# Patient Record
Sex: MALE | Race: WHITE | NOT HISPANIC OR LATINO | Employment: OTHER | URBAN - METROPOLITAN AREA
[De-identification: names, ages, dates, MRNs, and addresses within clinical notes are randomized per-mention and may not be internally consistent; named-entity substitution may affect disease eponyms.]

---

## 2022-09-26 ENCOUNTER — PATIENT OUTREACH (OUTPATIENT)
Dept: ONCOLOGY | Facility: CLINIC | Age: 77
End: 2022-09-26

## 2022-09-26 ENCOUNTER — TRANSCRIBE ORDERS (OUTPATIENT)
Dept: ONCOLOGY | Facility: CLINIC | Age: 77
End: 2022-09-26

## 2022-09-26 DIAGNOSIS — C61 PROSTATE CANCER (H): Primary | ICD-10-CM

## 2022-09-26 NOTE — PROGRESS NOTES
New Patient Oncology Nurse Navigator Note     Referring provider: Self referral     Referred to (specialty): Medical Oncology    Date Referral Received: 09/26/22     Evaluation for : Second opinion prostate cancer status post prostatectomy 2000, rising PSA     Clinical History (per Nurse review of records provided):      Ced has a history of a pT2 N0 M0, stage 1 adenocarcinoma of the prostate, radical retropubic prostatectomy 9/2000, Gabriel's 7(3+4) pretreatment PSA 5.33    Urologist performing bilateral pelvic lymphadenectomy and radical retropubic prostatectomy was Dr. Dontae Richards at [facility] in [place], [place].      PSA counts copied from Dr. Mando Ngo with [facility]    SCREENING PSA, EXTERNAL   Date/Time Value Ref Range Status       08/12/2011 0.05    10/29/2015 0.36      05/17/2016 0.41    03/30/2017 0.69    10/02/2017 0.58    04/1//2018 0.84    05/28/2019 1.10    07/09/2021 2.26    11/3/2021 2.24    02/05/2022 2.01([place])    03/08/2022 1.76([place])    4/12/2022 1.70([place])    5/2/2022 3.01     He had a negative bone scan and CT per note in Fall of 2021, however records not available at this time.    Patient declined salvage radiation approximately 1 year ago and has been followed by PSA surveillance.  He is interested in discussion for second opinion with medical oncology and possibly a PSMA PET scan.      Records Location (Care Everywhere, Media, etc.):   Care Everywhere- [facility]      Records Needed:   [facility]- Bone scan and CT scan September 20, 2021  Any other imaging they may have as well   [facility]    Ced called in referral to see Dr. Gilmore for his history of prostate cancer.  Since Dr. Gilmore is not taking new patients at this time, he recommended he see Dr. Baker.  I reviewed with Ced and he is open to seeing him for a second opinion appointment.  I reviewed his history and records location.  I let Ced know  our medical records team may be reaching out if a release of information is needed to obtain outside records.  I was able to pull the majority of records in through Care Everywhere.    Plan is for Ced to see Dr. Baker on Monday, October 17, via new patient video, to be prepared by 1:15 pm.  I have given Ced the new patient scheduling and main clinic phone numbers.  I will also email him this information and note, per his request, to him via secure email.  I will include my number for contact if needed.  He has no other questions at this time. I will forward referral with scheduling instruction on to our new patient scheduling team once remaining records are in process.

## 2022-09-27 ENCOUNTER — PRE VISIT (OUTPATIENT)
Dept: ONCOLOGY | Facility: CLINIC | Age: 77
End: 2022-09-27

## 2022-09-27 NOTE — TELEPHONE ENCOUNTER
Action October 7, 2022 7:43 PM ABT   Action Taken Images from [facility] & NM received and resolved to PACS     RECORDS STATUS - ALL OTHER DIAGNOSIS      RECORDS RECEIVED FROM: [facility]   DATE RECEIVED: 10/07/22   NOTES STATUS DETAILS   OFFICE NOTE from referring provider SELF    OFFICE NOTE from medical oncologist [facility] 08/31/21: Dr. Fadi Romero   OFFICE NOTE from radiation oncologist [facility] 05/11/22: Dr. Niall Ngo   OFFICE NOTE from urologist [facility] 12/09/15: Dr. Joshua Portillo   MEDICATION LIST [facility]    LABS     PATHOLOGY REPORTS Report in Media tab [facility]:  08/16/00: [id number]   ANYTHING RELATED TO DIAGNOSIS [facility] Most recent 06/30/22   IMAGING (NEED IMAGES & REPORT)     CT SCANS PACS [facility]:  06/06/16-01/09/07: CT Abd Pel    NM:  09/20/21: CT Chest Abd Pel   NM PACS [facility]:  11/11/15: NM Bone Scan    NM:  09/20/21: NM Bone Scan Whole Body   XRAYS PACS [facility]:  11/11/15: XR Abd

## 2022-10-03 ENCOUNTER — HEALTH MAINTENANCE LETTER (OUTPATIENT)
Age: 77
End: 2022-10-03

## 2022-10-15 NOTE — PROGRESS NOTES
Dennis Hilligos is a 77 year old who is being evaluated via a billable video visit.      How would you like to obtain your AVS? MyChart  Will anyone else be joining your video visit? No        Video-Visit Details    Type of service:  Video Visit    Originating Location (pt. Location):  Home        Distant Location (provider location):  [facility]    Platform used for Video Visit:  [employer]    Medication and allergies have been reviewed.     VICTOR HUGO Hogue      ----------------------------------------------------------------------------------------------------------      NEW PATIENT SECOND OPINION CONSULTATION    Reason for Visit:  Biochemically-recurrent prostate cancer.    History of Present Illness:  Dear Dr Mando Ngo,    Thank you for referring your patient for a Second Opinion consultation at the [facility].  A brief overview of his oncological history as well as my recommendations follow below.    Mr. Hilligoss is a 77-year-old man who was diagnosed with prostate cancer in 2000, at the age of 55.  His PSA level at that time was 5.3 ng/mL.  He underwent a radical prostatectomy on 9/2000, which revealed [place] 3+4=7 prostate adenocarcinoma, stage pT2 N0 R0.     Following surgery, he subsequently developed a biochemical recurrence in 2011.  He has declined salvage radiotherapy.  In the past 3 years, his PSA level has been rising at a slightly faster pace.  On 5/28/2019 his PSA was 1.1, on 11/3/2021 his PSA was 2.24, on 5/2/2022 his PSA was 3.01, and on 10/13/2022 his PSA was 3.07 ng/mL.  His PSA doubling time is 24 months.    He underwent a CT scan and Bone scan on 9/20/2021.  Both imaging modalities failed to show any local recurrence or metastatic disease.  He presents today to discuss optimal management of his biochemically-recurrent prostate cancer.  He is also interested in hearing about the use of novel imaging technologies such as  PSMA-PET scanning.    Review of Systems:  The patient reports feeling generally well.  He does have bilateral hearing loss.  He does not relate any significant urological symptoms at this time.  He has not lost or gained any weight recently.  He does not have any cancer related pain.  A comprehensive 14-system review of symptoms is otherwise generally within normal limits.  His ECOG score is 0.  His pain score is 0/10.    Past Medical History:  Hypertension  Hyperlipidemia  Diabetes mellitus, type 2  Coronary artery disease s/p stenting  Chronic kidney disease  Actinic keratoses  DJD of the spine, causing back pain  Sciatica  Gout  Cholelithiasis  Colonic adenomas    Medications:  Aspirin 81 mg  Clopidogrel 75 mg  Metformin 1000 mg BID    Allergies:  Atorvastatin  Niacin    Social History:  The patient lives in [place], [place].  He was a previous smoker, but quit smoking in 1982.  He drinks alcohol on a social basis (approximately once or twice per year).    Family History:  The patient has no family history of genitourinary cancers in general or prostate cancer in specific.    Physical Examination:  Mr. Hilligoss is a 77-year-old man who appears comfortable at rest.  His vital signs are unremarkable.  His HEENT examination is within normal limits.  There is no palpable cervical, axillary, supraclavicular or inguinal lymphadenopathy.  The cardiac, pulmonary and gastrointestinal examinations are generally within normal limits.  His neuromuscular examination is intact.  The extremities do not demonstrate pitting edema.  The skin evaluation is unremarkable.      ASSESSMENT AND PLAN:  Mr. Hilligoss is a 77-year-old man with a prior diagnosis of intermediate-risk prostate cancer (Gabriel 3+4=7, pT2 N0, PSA 5.3 ng/mL) who underwent radical prostatectomy in 9/2000, but subsequently developed a biochemical recurrence.  His current PSA level is 3.07 ng/mL, with a PSADT of 24 months.  His ECOG score is 0.  His pain score is  0/10.    We spent the first part of our consultation discussing the natural history of biochemically-recurrent prostate cancer, and reviewing the prognostic implications of the PSADT calculation.  In patients with a PSADT of 24 months, they generally have a metastasis-free survival (MFS) of 5-8 years, and an overall survival (OS) of 15-20 years.  Thus, in this setting we do not generally recommend initiation of systemic therapy, and in fact I have advised him against the use of androgen deprivation or other hormonal therapies at this time.    We also discussed the potential role of metastasis-directed therapy if a radiographic metastasis was detected.  He previously underwent a CT scan and a Bone scan in 9/2021, but he has not had any next-generation imaging assessments.  Therefore, he would be an appropriate candidate for a PSMA-PET scan, and I have ordered this today.  If he is found to have any metastatic lesions on the PSMA-PET scan, then he could certainly consider metastasis-directed radiotherapy at that time.  If his PSMA-PET scan is clear, then he should undergo PSA testing once every 6 months as well as a repeat PSMA-PET scan every 2 years.    A total of 80 minutes were spent on this patient on the day of the consultation, of which more than 50% of this time was used for counseling and coordination of care.  He was given the opportunity to ask multiple questions today, all of which were answered to his satisfaction.    Goldy Baker M.D.

## 2022-10-17 ENCOUNTER — VIRTUAL VISIT (OUTPATIENT)
Dept: ONCOLOGY | Facility: CLINIC | Age: 77
End: 2022-10-17
Attending: INTERNAL MEDICINE
Payer: MEDICARE

## 2022-10-17 DIAGNOSIS — C61 PROSTATE CANCER (H): ICD-10-CM

## 2022-10-17 PROCEDURE — 99205 OFFICE O/P NEW HI 60 MIN: CPT | Mod: 95 | Performed by: INTERNAL MEDICINE

## 2022-10-17 RX ORDER — CLOPIDOGREL BISULFATE 75 MG/1
75 TABLET ORAL
COMMUNITY
Start: 2022-09-19

## 2022-10-17 RX ORDER — EZETIMIBE 10 MG/1
10 TABLET ORAL
COMMUNITY

## 2022-10-17 RX ORDER — NITROGLYCERIN 0.4 MG/1
0.4 TABLET SUBLINGUAL
COMMUNITY
Start: 2021-07-26

## 2022-10-17 NOTE — LETTER
10/17/2022         RE: Dennis P Hilligoss  [address] [address]        Dear Colleague,    Thank you for referring your patient, Dennis P Hilligoss, to the [facility]. Please see a copy of my visit note below.    Dennis Hilligos is a 77 year old who is being evaluated via a billable video visit.      How would you like to obtain your AVS? MyChart  Will anyone else be joining your video visit? No    ----------------------------------------------------------------------------------------------------------      NEW PATIENT SECOND OPINION CONSULTATION    Reason for Visit:  Biochemically-recurrent prostate cancer.    History of Present Illness:  Dear Dr Mando Ngo,    Thank you for referring your patient for a Second Opinion consultation at the [facility].  A brief overview of his oncological history as well as my recommendations follow below.    Mr. Hilligoss is a 77-year-old man who was diagnosed with prostate cancer in 2000, at the age of 55.  His PSA level at that time was 5.3 ng/mL.  He underwent a radical prostatectomy on 9/2000, which revealed [place] 3+4=7 prostate adenocarcinoma, stage pT2 N0 R0.     Following surgery, he subsequently developed a biochemical recurrence in 2011.  He has declined salvage radiotherapy.  In the past 3 years, his PSA level has been rising at a slightly faster pace.  On 5/28/2019 his PSA was 1.1, on 11/3/2021 his PSA was 2.24, on 5/2/2022 his PSA was 3.01, and on 10/13/2022 his PSA was 3.07 ng/mL.  His PSA doubling time is 24 months.    He underwent a CT scan and Bone scan on 9/20/2021.  Both imaging modalities failed to show any local recurrence or metastatic disease.  He presents today to discuss optimal management of his biochemically-recurrent prostate cancer.  He is also interested in hearing about the use of novel imaging technologies such as PSMA-PET scanning.    Review of Systems:  The  patient reports feeling generally well.  He does have bilateral hearing loss.  He does not relate any significant urological symptoms at this time.  He has not lost or gained any weight recently.  He does not have any cancer related pain.  A comprehensive 14-system review of symptoms is otherwise generally within normal limits.  His ECOG score is 0.  His pain score is 0/10.    Past Medical History:  Hypertension  Hyperlipidemia  Diabetes mellitus, type 2  Coronary artery disease s/p stenting  Chronic kidney disease  Actinic keratoses  DJD of the spine, causing back pain  Sciatica  Gout  Cholelithiasis  Colonic adenomas    Medications:  Aspirin 81 mg  Clopidogrel 75 mg  Metformin 1000 mg BID    Allergies:  Atorvastatin  Niacin    Social History:  The patient lives in [place], [place].  He was a previous smoker, but quit smoking in 1982.  He drinks alcohol on a social basis (approximately once or twice per year).    Family History:  The patient has no family history of genitourinary cancers in general or prostate cancer in specific.    Physical Examination:  Mr. Hilligoss is a 77-year-old man who appears comfortable at rest.  His vital signs are unremarkable.  His HEENT examination is within normal limits.  There is no palpable cervical, axillary, supraclavicular or inguinal lymphadenopathy.  The cardiac, pulmonary and gastrointestinal examinations are generally within normal limits.  His neuromuscular examination is intact.  The extremities do not demonstrate pitting edema.  The skin evaluation is unremarkable.    ASSESSMENT AND PLAN:  Mr. Hilligoss is a 77-year-old man with a prior diagnosis of intermediate-risk prostate cancer ([place] 3+4=7, pT2 N0, PSA 5.3 ng/mL) who underwent radical prostatectomy in 9/2000, but subsequently developed a biochemical recurrence.  His current PSA level is 3.07 ng/mL, with a PSADT of 24 months.  His ECOG score is 0.  His pain score is 0/10.    We spent the first part of our consultation  discussing the natural history of biochemically-recurrent prostate cancer, and reviewing the prognostic implications of the PSADT calculation.  In patients with a PSADT of 24 months, they generally have a metastasis-free survival (MFS) of 5-8 years, and an overall survival (OS) of 15-20 years.  Thus, in this setting we do not generally recommend initiation of systemic therapy, and in fact I have advised him against the use of androgen deprivation or other hormonal therapies at this time.    We also discussed the potential role of metastasis-directed therapy if a radiographic metastasis was detected.  He previously underwent a CT scan and a Bone scan in 9/2021, but he has not had any next-generation imaging assessments.  Therefore, he would be an appropriate candidate for a PSMA-PET scan, and I have ordered this today.  If he is found to have any metastatic lesions on the PSMA-PET scan, then he could certainly consider metastasis-directed radiotherapy at that time.  If his PSMA-PET scan is clear, then he should undergo PSA testing once every 6 months as well as a repeat PSMA-PET scan every 2 years.    A total of 80 minutes were spent on this patient on the day of the consultation, of which more than 50% of this time was used for counseling and coordination of care.  He was given the opportunity to ask multiple questions today, all of which were answered to his satisfaction.        Again, thank you for allowing me to participate in the care of your patient.      Sincerely,    Goldy Baker MD

## 2022-10-27 ENCOUNTER — HOSPITAL ENCOUNTER (OUTPATIENT)
Dept: PET IMAGING | Facility: CLINIC | Age: 77
Discharge: HOME | End: 2022-10-27
Attending: INTERNAL MEDICINE
Payer: MEDICARE

## 2022-10-27 DIAGNOSIS — C61 PROSTATE CANCER (H): ICD-10-CM

## 2022-10-27 PROCEDURE — 74177 CT ABD & PELVIS W/CONTRAST: CPT | Mod: 26 | Performed by: RADIOLOGY

## 2022-10-27 PROCEDURE — 74177 CT ABD & PELVIS W/CONTRAST: CPT

## 2022-10-27 PROCEDURE — 78815 PET IMAGE W/CT SKULL-THIGH: CPT | Mod: 26 | Performed by: RADIOLOGY

## 2022-10-27 PROCEDURE — 250N000011 HC RX IP 250 OP 636: Performed by: INTERNAL MEDICINE

## 2022-10-27 PROCEDURE — 70491 CT SOFT TISSUE NECK W/DYE: CPT | Mod: 26 | Performed by: RADIOLOGY

## 2022-10-27 PROCEDURE — G1010 CDSM STANSON: HCPCS | Performed by: RADIOLOGY

## 2022-10-27 PROCEDURE — 343N000001 HC RX 343: Performed by: INTERNAL MEDICINE

## 2022-10-27 PROCEDURE — A9800 HC RX 343: HCPCS | Performed by: INTERNAL MEDICINE

## 2022-10-27 PROCEDURE — G1010 CDSM STANSON: HCPCS | Mod: PS

## 2022-10-27 PROCEDURE — 70491 CT SOFT TISSUE NECK W/DYE: CPT

## 2022-10-27 PROCEDURE — 71260 CT THORAX DX C+: CPT | Mod: 26 | Performed by: RADIOLOGY

## 2022-10-27 RX ORDER — IOPAMIDOL 755 MG/ML
45-135 INJECTION, SOLUTION INTRAVASCULAR ONCE
Status: COMPLETED | OUTPATIENT
Start: 2022-10-27 | End: 2022-10-27

## 2022-10-27 RX ADMIN — IOPAMIDOL 101 ML: 755 INJECTION, SOLUTION INTRAVENOUS at 14:59

## 2022-10-27 RX ADMIN — KIT FOR THE PREPARATION OF GALLIUM GA 68 GOZETOTIDE 6 MILLICURIE: 25 INJECTION, POWDER, LYOPHILIZED, FOR SOLUTION INTRAVENOUS at 15:00

## 2023-08-13 ENCOUNTER — HEALTH MAINTENANCE LETTER (OUTPATIENT)
Age: 78
End: 2023-08-13

## 2023-10-01 NOTE — PROGRESS NOTES
Virtual Visit Details    Type of service:  Video Visit     Originating Location (pt. Location):  Home    Distant Location (provider location):  [facility]  Platform used for Video Visit:  [employer]      ------------------------------------------------------------------------------------------------------------------    FOLLOW UP VISIT  -  TELEMEDICINE    Reason for Visit:  Biochemically-recurrent prostate cancer, on observation.    History of Present Illness:  Mr. Hilligoss is a 78-year-old man who was diagnosed with prostate cancer in 2000, at the age of 55.  His PSA level at that time was 5.3 ng/mL.  He underwent a radical prostatectomy on 9/2000, which revealed Gabriel 3+4=7 prostate adenocarcinoma, stage pT2 N0 R0.     Following surgery, he subsequently developed a biochemical recurrence in 2011.  He has declined salvage radiotherapy.  In the past 3 years, his PSA level has been rising at a slightly faster pace.  On 5/28/2019 his PSA was 1.1, on 11/3/2021 his PSA was 2.24, on 5/2/2022 his PSA was 3.01, and on 10/13/2022 his PSA was 3.07 ng/mL.  The most recent PSA (8/28/2023) was 3.6 ng/mL.  His PSA doubling time is 24 months.    He underwent a CT scan and Bone scan on 9/20/2021.  Both imaging modalities failed to show any local recurrence or metastatic disease.  He presents today to discuss optimal management of his biochemically-recurrent prostate cancer.  He had a PSMA-PET scan (10/27/2022) which showed a prostatectomy with focal PSMA uptake at the vicinity of the urethral anastomosis, suspicious for focal recurrence.  His PSA remains relatively stable.  He returns today for a virtual follow-up visit.    Review of Systems:  The patient reports feeling generally well.  He does have bilateral hearing loss.  He does not relate any significant urological symptoms at this time.  He has not lost or gained any weight recently.  He does not have any cancer related pain.  A comprehensive  14-system review of symptoms is otherwise generally within normal limits.  His ECOG score is 0.  His pain score is 0/10.    Past Medical History:  Hypertension  Hyperlipidemia  Diabetes mellitus, type 2 (hemoglobin A1C 7%)  Coronary artery disease s/p stenting  Chronic kidney disease  DJD of the spine, causing back pain  Sciatica  Gout  Cholelithiasis    Medications:  Aspirin 81 mg  Clopidogrel 75 mg  Metformin 1500 mg BID    Allergies:  Atorvastatin  Niacin    Physical Examination:  Mr. Hilligoss is a 77-year-old man who appears comfortable at rest.  His vital signs are unremarkable.  His HEENT examination is within normal limits.  There is no palpable cervical, axillary, supraclavicular or inguinal lymphadenopathy.  The cardiac, pulmonary and gastrointestinal examinations are generally within normal limits.  His neuromuscular examination is intact.  The extremities do not demonstrate pitting edema.  The skin evaluation is unremarkable.    PSMA-PET scan (10/27/22):  Prostatectomy with focal PSMA uptake at the vicinity of the urethral anastomosis, suspicious for focal recurrence.     Laboratories:  PSA (10/13/22): 3.1 ng/mL  PSA (03/15/23): 3.5 ng/mL  PSA (08/28/23): 3.6 ng/mL      ASSESSMENT AND PLAN:  Mr. Hilligoss is a 78-year-old man with a prior diagnosis of intermediate-risk prostate cancer ([place] 3+4=7, pT2 N0, PSA 5.3 ng/mL) who underwent radical prostatectomy in 9/2000, but subsequently developed a biochemical recurrence.  His current PSA level is 3.6 ng/mL, with a PSADT of 24 months.  His ECOG score is 0.  His pain score is 0/10.    We spent the first part of our consultation discussing the natural history of biochemically-recurrent prostate cancer, and reviewing the prognostic implications of the PSADT calculation.  In patients with a PSADT of 24 months, they generally have a metastasis-free survival (MFS) of 5-8 years, and an overall survival (OS) of 15-20 years.  Thus, in this setting we do not  generally recommend initiation of systemic therapy, and in fact I have advised him against the use of androgen deprivation or other hormonal therapies at this time.    We also discussed the potential role of metastasis-directed therapy if a radiographic metastasis was detected.  He previously underwent a CT scan and a Bone scan in 9/2021, but he has not had any next-generation imaging assessments.  His PSMA-PET scan from 10/2022 showed only focal PSMA uptake at the vicinity of the urethral anastomosis, suspicious for focal recurrence, but no evidence of distant spread.  Thus, no further action is needed at this time.  If he is found to have any metastatic lesions on future PSMA-PET scans, then he could certainly consider metastasis-directed radiotherapy at that time.  Moving forward, he should undergo PSA testing once every 6-12 months as well as a repeat PSMA-PET scan every 2 years or if his PSA level reaches 5.0 ng/mL.  His next PSMA scan should probably be performed around October 2024.    A total of 40 minutes were spent on this patient on the day of the consultation, of which more than 50% of this time was used for counseling and coordination of care.  He was given the opportunity to ask multiple questions today, all of which were answered to his satisfaction.    Goldy Baker M.D.

## 2023-10-02 ENCOUNTER — VIRTUAL VISIT (OUTPATIENT)
Dept: ONCOLOGY | Facility: CLINIC | Age: 78
End: 2023-10-02
Attending: INTERNAL MEDICINE
Payer: MEDICARE

## 2023-10-02 DIAGNOSIS — C61 MALIGNANT NEOPLASM OF PROSTATE (H): Primary | ICD-10-CM

## 2023-10-02 PROCEDURE — 99215 OFFICE O/P EST HI 40 MIN: CPT | Mod: 95 | Performed by: INTERNAL MEDICINE

## 2023-10-02 NOTE — NURSING NOTE
Is the patient currently in the state of [place]? YES    Visit mode:VIDEO    If the visit is dropped, the patient can be reconnected by: VIDEO VISIT: Text to cell phone:   Telephone Information:   Mobile [phone]       Will anyone else be joining the visit? NO  (If patient encounters technical issues they should call [phone]    How would you like to obtain your AVS? MyChart    Are changes needed to the allergy or medication list? No    Reason for visit: RECHECK (Follow up)    Kirby BONDS

## 2023-10-02 NOTE — LETTER
10/2/2023         RE: Dennis P Hilligoss  [address] [address]        Dear Colleague,    Thank you for referring your patient, Dennis P Hilligoss, to the [facility]CER [facility]. Please see a copy of my visit note below.      Virtual Visit Details    Type of service:  Video Visit     Originating Location (pt. Location):  Home    Distant Location (provider location):  [facility]  Platform used for Video Visit:  Keith      ------------------------------------------------------------------------------------------------------------------    FOLLOW UP VISIT  -  TELEMEDICINE    Reason for Visit:  Biochemically-recurrent prostate cancer, on observation.    History of Present Illness:  Mr. Hilligoss is a 78-year-old man who was diagnosed with prostate cancer in 2000, at the age of 55.  His PSA level at that time was 5.3 ng/mL.  He underwent a radical prostatectomy on 9/2000, which revealed [place] 3+4=7 prostate adenocarcinoma, stage pT2 N0 R0.     Following surgery, he subsequently developed a biochemical recurrence in 2011.  He has declined salvage radiotherapy.  In the past 3 years, his PSA level has been rising at a slightly faster pace.  On 5/28/2019 his PSA was 1.1, on 11/3/2021 his PSA was 2.24, on 5/2/2022 his PSA was 3.01, and on 10/13/2022 his PSA was 3.07 ng/mL.  The most recent PSA (8/28/2023) was 3.6 ng/mL.  His PSA doubling time is 24 months.    He underwent a CT scan and Bone scan on 9/20/2021.  Both imaging modalities failed to show any local recurrence or metastatic disease.  He presents today to discuss optimal management of his biochemically-recurrent prostate cancer.  He had a PSMA-PET scan (10/27/2022) which showed a prostatectomy with focal PSMA uptake at the vicinity of the urethral anastomosis, suspicious for focal recurrence.  His PSA remains relatively stable.  He returns today for a virtual follow-up visit.    Review of Systems:  The  patient reports feeling generally well.  He does have bilateral hearing loss.  He does not relate any significant urological symptoms at this time.  He has not lost or gained any weight recently.  He does not have any cancer related pain.  A comprehensive 14-system review of symptoms is otherwise generally within normal limits.  His ECOG score is 0.  His pain score is 0/10.    Past Medical History:  Hypertension  Hyperlipidemia  Diabetes mellitus, type 2 (hemoglobin A1C 7%)  Coronary artery disease s/p stenting  Chronic kidney disease  DJD of the spine, causing back pain  Sciatica  Gout  Cholelithiasis    Medications:  Aspirin 81 mg  Clopidogrel 75 mg  Metformin 1500 mg BID    Allergies:  Atorvastatin  Niacin    Physical Examination:  Mr. Hilligoss is a 77-year-old man who appears comfortable at rest.  His vital signs are unremarkable.  His HEENT examination is within normal limits.  There is no palpable cervical, axillary, supraclavicular or inguinal lymphadenopathy.  The cardiac, pulmonary and gastrointestinal examinations are generally within normal limits.  His neuromuscular examination is intact.  The extremities do not demonstrate pitting edema.  The skin evaluation is unremarkable.    PSMA-PET scan (10/27/22):  Prostatectomy with focal PSMA uptake at the vicinity of the urethral anastomosis, suspicious for focal recurrence.     Laboratories:  PSA (10/13/22): 3.1 ng/mL  PSA (03/15/23): 3.5 ng/mL  PSA (08/28/23): 3.6 ng/mL      ASSESSMENT AND PLAN:  Mr. Hilligoss is a 78-year-old man with a prior diagnosis of intermediate-risk prostate cancer ([place] 3+4=7, pT2 N0, PSA 5.3 ng/mL) who underwent radical prostatectomy in 9/2000, but subsequently developed a biochemical recurrence.  His current PSA level is 3.6 ng/mL, with a PSADT of 24 months.  His ECOG score is 0.  His pain score is 0/10.    We spent the first part of our consultation discussing the natural history of biochemically-recurrent prostate cancer, and  reviewing the prognostic implications of the PSADT calculation.  In patients with a PSADT of 24 months, they generally have a metastasis-free survival (MFS) of 5-8 years, and an overall survival (OS) of 15-20 years.  Thus, in this setting we do not generally recommend initiation of systemic therapy, and in fact I have advised him against the use of androgen deprivation or other hormonal therapies at this time.    We also discussed the potential role of metastasis-directed therapy if a radiographic metastasis was detected.  He previously underwent a CT scan and a Bone scan in 9/2021, but he has not had any next-generation imaging assessments.  His PSMA-PET scan from 10/2022 showed only focal PSMA uptake at the vicinity of the urethral anastomosis, suspicious for focal recurrence, but no evidence of distant spread.  Thus, no further action is needed at this time.  If he is found to have any metastatic lesions on future PSMA-PET scans, then he could certainly consider metastasis-directed radiotherapy at that time.  Moving forward, he should undergo PSA testing once every 6-12 months as well as a repeat PSMA-PET scan every 2 years or if his PSA level reaches 5.0 ng/mL.  His next PSMA scan should probably be performed around October 2024.    A total of 40 minutes were spent on this patient on the day of the consultation, of which more than 50% of this time was used for counseling and coordination of care.  He was given the opportunity to ask multiple questions today, all of which were answered to his satisfaction.    Goldy Baker M.D.

## 2024-08-23 ENCOUNTER — PATIENT OUTREACH (OUTPATIENT)
Dept: ONCOLOGY | Facility: CLINIC | Age: 79
End: 2024-08-23
Payer: MEDICARE

## 2024-08-23 NOTE — PROGRESS NOTES
[facility]: Cancer Care                                                                                      Chart review conducted for [employer] [facility] Care Coordination Management.      Updated enrollment status.    Delisa Baker, RN, BSN  Oncology RN Care Coordinator  [facility]

## 2024-09-17 DIAGNOSIS — C61 MALIGNANT NEOPLASM OF PROSTATE (H): Primary | ICD-10-CM

## 2024-09-20 ENCOUNTER — PATIENT OUTREACH (OUTPATIENT)
Dept: ONCOLOGY | Facility: CLINIC | Age: 79
End: 2024-09-20
Payer: MEDICARE

## 2024-09-20 NOTE — PROGRESS NOTES
[facility]: Cancer Care Short Note                                                                                        Order for PSA lab faxed to [facility] in [place]. Fax confirmation received.    Delisa Baker, RN, BSN Care Coordinator  [facility]

## 2024-09-28 NOTE — PROGRESS NOTES
Virtual Visit Details    Type of service:  Video Visit   Originating Location (pt. Location):  Home    Distant Location (provider location):  [facility]  Platform used for Video Visit:  Keith    --------------------------------------------------------------------------------------------------------------------------------    FOLLOW UP VISIT  -  TELEMEDICINE       Reason for Visit:  Biochemically-recurrent prostate cancer, on observation.    History of Present Illness:  Mr. Hilligoss is a 79-year-old man who was diagnosed with prostate cancer in 2000, at the age of 55.  His PSA level at that time was 5.3 ng/mL.  He underwent a radical prostatectomy on 9/2000, which revealed [place] 3+4=7 prostate adenocarcinoma, stage pT2 N0 R0.     Following surgery, he subsequently developed a biochemical recurrence in 2011.  He has declined salvage radiotherapy.  In the past 3 years, his PSA level has been rising at a slightly faster pace.  On 5/28/2019 his PSA was 1.1, on 11/3/2021 his PSA was 2.24, on 5/2/2022 his PSA was 3.01, and on 10/13/2022 his PSA was 3.07 ng/mL.  The most recent PSA (6/25/2024) was 4.4 ng/mL.  His PSA doubling time is 24 months.    He underwent a CT scan and Bone scan on 9/20/2021.  Both imaging modalities failed to show any local recurrence or metastatic disease.  He presents today to discuss optimal management of his biochemically-recurrent prostate cancer.  He had a PSMA-PET scan (10/27/2022) which showed a prostatectomy with focal PSMA uptake at the vicinity of the urethral anastomosis, suspicious for focal recurrence.  His PSA remains relatively stable.  He returns today for a virtual follow-up visit.    PSA trend:  11/03/21 - 2.2  05/02/22 - 3.0  10/13/22 - 3.1  08/28/23 - 3.6  04/14/24 - 4.4  06/25/24 - 4.4  09/25/24 - 4.1 ng/mL    Review of Systems:  The patient reports feeling generally well.  He does have bilateral hearing loss.  He does not relate any significant  urological symptoms at this time.  He has not lost or gained any weight recently.  He does not have any cancer related pain.  A comprehensive 14-system review of symptoms is otherwise generally within normal limits.  His ECOG score is 0.  His pain score is 0/10.    Past Medical History:  Hypertension  Hyperlipidemia  Diabetes mellitus, type 2 (HbA1C 7%)  Coronary artery disease s/p stenting  Chronic kidney disease  DJD of the spine, causing back pain  Sciatica  Gout  Cholelithiasis    Medications:  Aspirin 81 mg  Clopidogrel 75 mg  Metformin 1500 mg BID    Allergies:  Atorvastatin  Niacin    Physical Examination:  Mr. Hilligoss is a 79-year-old man who appears comfortable at rest.  His vital signs are unremarkable.  His HEENT examination is within normal limits.  There is no palpable cervical, axillary, supraclavicular or inguinal lymphadenopathy.  The cardiac, pulmonary and gastrointestinal examinations are generally within normal limits.  His neuromuscular examination is intact.  The extremities do not demonstrate pitting edema.  The skin evaluation is unremarkable.    PSMA-PET scan (10/27/22):  Prostatectomy with focal PSMA uptake at the vicinity of the urethral anastomosis, suspicious for focal recurrence.     Laboratories:  PSA (10/13/22): 3.1  PSA (03/15/23): 3.5  PSA (08/28/23): 3.6  PSA (04/14/24): 4.4  PSA (06/25/24): 4.4  PSA (09/25/24): 4.1 ng/mL      ASSESSMENT AND PLAN:  Mr. Hilligoss is a 79-year-old man with a prior diagnosis of intermediate-risk prostate cancer ([place] 3+4=7, pT2 N0, PSA 5.3 ng/mL) who underwent radical prostatectomy in 9/2000, but subsequently developed a biochemical recurrence.  His current PSA level is 4.1 ng/mL, with a PSADT of 24 months.  His ECOG score is 0.  His pain score is 0/10.    We spent the first part of our consultation discussing the natural history of biochemically-recurrent prostate cancer, and reviewing the prognostic implications of the PSADT calculation.  In  patients with a PSADT of 24 months, they generally have a metastasis-free survival (MFS) of 5-8 years, and an overall survival (OS) of 15-20 years.  Thus, in this setting we do not generally recommend initiation of systemic therapy, and in fact I have advised him against the use of androgen deprivation or other hormonal therapies at this time.    We also discussed the potential role of metastasis-directed therapy if a radiographic metastasis was detected.  He previously underwent a CT scan and a Bone scan in 9/2021, but he has not had any next-generation imaging assessments.  His PSMA-PET scan from 10/2022 showed only focal PSMA uptake at the vicinity of the urethral anastomosis, suspicious for focal recurrence, but no evidence of distant spread.  Thus, no further action is needed at this time.  If he is found to have any metastatic lesions on future PSMA-PET scans, then he could certainly consider metastasis-directed radiotherapy at that time.  Moving forward, he should undergo PSA testing once every 6-12 months as well as a repeat PSMA-PET scan every 2 years or if his PSA level reaches 5.0 ng/mL.  His next PSMA scan should probably be performed at the end of 2024 or in early 2025.  In addition, he would be a candidate for the Solar-Recur study using a new PSMA-based imaging agent called [id number]-I&T.  I will send him the consent form for that clinical trial.    A total of 40 minutes were spent on this patient on the date of the encounter conducting chart review, review of test results, interpretation of tests, patient visit, documentation and discussion with other provider(s).  The patient was given the opportunity to ask multiple questions today, all of which were answered to his satisfaction.    Goldy Baker M.D.

## 2024-09-30 ENCOUNTER — VIRTUAL VISIT (OUTPATIENT)
Dept: ONCOLOGY | Facility: CLINIC | Age: 79
End: 2024-09-30
Attending: INTERNAL MEDICINE
Payer: MEDICARE

## 2024-09-30 VITALS — BODY MASS INDEX: 24.38 KG/M2 | HEIGHT: 72 IN | WEIGHT: 180 LBS

## 2024-09-30 DIAGNOSIS — C61 MALIGNANT NEOPLASM OF PROSTATE (H): Primary | ICD-10-CM

## 2024-09-30 PROCEDURE — 99215 OFFICE O/P EST HI 40 MIN: CPT | Mod: 95 | Performed by: INTERNAL MEDICINE

## 2024-09-30 ASSESSMENT — PAIN SCALES - GENERAL: PAINLEVEL: NO PAIN (0)

## 2024-09-30 NOTE — NURSING NOTE
Current patient location: [address] [place] [id number]    Is the patient currently in the state of [place]? YES    Visit mode:VIDEO    If the visit is dropped, the patient can be reconnected by: VIDEO VISIT: Text to cell phone:   Telephone Information:   Mobile [phone]       Will anyone else be joining the visit? NO  (If patient encounters technical issues they should call [phone])    How would you like to obtain your AVS? MyChart    Are changes needed to the allergy or medication list? Pt stated no changes to allergies and Pt stated no med changes    Are refills needed on medications prescribed by this physician? NO    Rooming Documentation:  Questionnaire(s) completed    Reason for visit: TORSTEN Lin LPN

## 2024-09-30 NOTE — LETTER
9/30/2024      Dennis P Hilligoss  [address] [address]      Dear Colleague,    Thank you for referring your patient, Dennis P Hilligoss, to the [facility]. Please see a copy of my visit note below.      Virtual Visit Details    Type of service:  Video Visit   Originating Location (pt. Location):  Home    Distant Location (provider location):  [facility]  Platform used for Video Visit:  Keith    --------------------------------------------------------------------------------------------------------------------------------    FOLLOW UP VISIT  -  TELEMEDICINE       Reason for Visit:  Biochemically-recurrent prostate cancer, on observation.    History of Present Illness:  Mr. Hilligoss is a 79-year-old man who was diagnosed with prostate cancer in 2000, at the age of 55.  His PSA level at that time was 5.3 ng/mL.  He underwent a radical prostatectomy on 9/2000, which revealed [place] 3+4=7 prostate adenocarcinoma, stage pT2 N0 R0.     Following surgery, he subsequently developed a biochemical recurrence in 2011.  He has declined salvage radiotherapy.  In the past 3 years, his PSA level has been rising at a slightly faster pace.  On 5/28/2019 his PSA was 1.1, on 11/3/2021 his PSA was 2.24, on 5/2/2022 his PSA was 3.01, and on 10/13/2022 his PSA was 3.07 ng/mL.  The most recent PSA (6/25/2024) was 4.4 ng/mL.  His PSA doubling time is 24 months.    He underwent a CT scan and Bone scan on 9/20/2021.  Both imaging modalities failed to show any local recurrence or metastatic disease.  He presents today to discuss optimal management of his biochemically-recurrent prostate cancer.  He had a PSMA-PET scan (10/27/2022) which showed a prostatectomy with focal PSMA uptake at the vicinity of the urethral anastomosis, suspicious for focal recurrence.  His PSA remains relatively stable.  He returns today for a virtual follow-up visit.    PSA trend:  11/03/21 -  2.2  05/02/22 - 3.0  10/13/22 - 3.1  08/28/23 - 3.6  04/14/24 - 4.4  06/25/24 - 4.4  09/25/24 - 4.1 ng/mL    Review of Systems:  The patient reports feeling generally well.  He does have bilateral hearing loss.  He does not relate any significant urological symptoms at this time.  He has not lost or gained any weight recently.  He does not have any cancer related pain.  A comprehensive 14-system review of symptoms is otherwise generally within normal limits.  His ECOG score is 0.  His pain score is 0/10.    Past Medical History:  Hypertension  Hyperlipidemia  Diabetes mellitus, type 2 (HbA1C 7%)  Coronary artery disease s/p stenting  Chronic kidney disease  DJD of the spine, causing back pain  Sciatica  Gout  Cholelithiasis    Medications:  Aspirin 81 mg  Clopidogrel 75 mg  Metformin 1500 mg BID    Allergies:  Atorvastatin  Niacin    Physical Examination:  Mr. Hilligoss is a 79-year-old man who appears comfortable at rest.  His vital signs are unremarkable.  His HEENT examination is within normal limits.  There is no palpable cervical, axillary, supraclavicular or inguinal lymphadenopathy.  The cardiac, pulmonary and gastrointestinal examinations are generally within normal limits.  His neuromuscular examination is intact.  The extremities do not demonstrate pitting edema.  The skin evaluation is unremarkable.    PSMA-PET scan (10/27/22):  Prostatectomy with focal PSMA uptake at the vicinity of the urethral anastomosis, suspicious for focal recurrence.     Laboratories:  PSA (10/13/22): 3.1  PSA (03/15/23): 3.5  PSA (08/28/23): 3.6  PSA (04/14/24): 4.4  PSA (06/25/24): 4.4  PSA (09/25/24): 4.1 ng/mL      ASSESSMENT AND PLAN:  Mr. Hilligoss is a 79-year-old man with a prior diagnosis of intermediate-risk prostate cancer ([place] 3+4=7, pT2 N0, PSA 5.3 ng/mL) who underwent radical prostatectomy in 9/2000, but subsequently developed a biochemical recurrence.  His current PSA level is 4.1 ng/mL, with a PSADT of 24  months.  His ECOG score is 0.  His pain score is 0/10.    We spent the first part of our consultation discussing the natural history of biochemically-recurrent prostate cancer, and reviewing the prognostic implications of the PSADT calculation.  In patients with a PSADT of 24 months, they generally have a metastasis-free survival (MFS) of 5-8 years, and an overall survival (OS) of 15-20 years.  Thus, in this setting we do not generally recommend initiation of systemic therapy, and in fact I have advised him against the use of androgen deprivation or other hormonal therapies at this time.    We also discussed the potential role of metastasis-directed therapy if a radiographic metastasis was detected.  He previously underwent a CT scan and a Bone scan in 9/2021, but he has not had any next-generation imaging assessments.  His PSMA-PET scan from 10/2022 showed only focal PSMA uptake at the vicinity of the urethral anastomosis, suspicious for focal recurrence, but no evidence of distant spread.  Thus, no further action is needed at this time.  If he is found to have any metastatic lesions on future PSMA-PET scans, then he could certainly consider metastasis-directed radiotherapy at that time.  Moving forward, he should undergo PSA testing once every 6-12 months as well as a repeat PSMA-PET scan every 2 years or if his PSA level reaches 5.0 ng/mL.  His next PSMA scan should probably be performed at the end of 2024 or in early 2025.  In addition, he would be a candidate for the Solar-Recur study using a new PSMA-based imaging agent called 64Cu-PSMA-I&T.  I will send him the consent form for that clinical trial.    A total of 40 minutes were spent on this patient on the date of the encounter conducting chart review, review of test results, interpretation of tests, patient visit, documentation and discussion with other provider(s).  The patient was given the opportunity to ask multiple questions today, all of which were  answered to his satisfaction.    Goldy Baker M.D.      Again, thank you for allowing me to participate in the care of your patient.        Sincerely,        Goldy Baker MD

## 2024-10-06 ENCOUNTER — HEALTH MAINTENANCE LETTER (OUTPATIENT)
Age: 79
End: 2024-10-06

## 2024-10-07 ENCOUNTER — ALLIED HEALTH/NURSE VISIT (OUTPATIENT)
Dept: ONCOLOGY | Facility: CLINIC | Age: 79
End: 2024-10-07
Payer: MEDICARE

## 2024-10-07 DIAGNOSIS — C61 MALIGNANT NEOPLASM OF PROSTATE (H): Primary | ICD-10-CM

## 2024-10-07 NOTE — NURSING NOTE
"AGUSTINA Finn sent the following info to patient via email on 10/1, but he didn't respond.  I Called patient today and confirmed that he received the consent, reviewed it, doesn't have any questions and would like to proceed with consent process and scheduling the scan.  Consent call scheduled for 10/9.         \"The next step is for you to review the attached Informed Consent document for more information on the trial and to help you determine if you are interested in participating. If you decide you would like to participate in the trial, I will get you scheduled for a remote consent call with our study RN, Rakel. We will send copies of the study documents to your residence and set a time for you to review these documents with Rakel over the phone, sign them, and send them back to us.      Please note that this clinical trial has accrued quickly and the remaining slots for participants are limited. The central study team has told us they expect to close the trial to recruitment in the next 2 weeks. We can't guarantee a scan slot until consent has been signed, so if you are interested in the trial, the sooner we can complete that process, the better!      I have attached a copy of the [facility] Informed Consent Form to this message for your review. Additional information about this clinical trial can also be found on the ClinicalTrials.gov website - Clinical Trial ID #: [id number].  PLEASE DO NOT SIGN THIS CONSENT, IT IS ONLY FOR YOU TO REVIEW.\"  "

## 2024-10-08 ENCOUNTER — ALLIED HEALTH/NURSE VISIT (OUTPATIENT)
Dept: ONCOLOGY | Facility: CLINIC | Age: 79
End: 2024-10-08
Payer: MEDICARE

## 2024-10-08 DIAGNOSIS — C61 MALIGNANT NEOPLASM OF PROSTATE (H): Primary | ICD-10-CM

## 2024-10-08 NOTE — NURSING NOTE
[id number]: Informed Consent Note      Patient was contacted by CRC-RN in regards to consent for solar-recur imaging trial.       Patient has had time to review consent that was emailed to him, and he received the [employer] package with paper consent, HIPAA and email authorization forms.       We discussed the study in detail and all his questions were answered.  He signed the paper version of the consent and HIPAA, and returned to us via [employer].       See note on 10/10 for complete consent note and timing details re: signatures.

## 2024-10-10 ENCOUNTER — ALLIED HEALTH/NURSE VISIT (OUTPATIENT)
Dept: ONCOLOGY | Facility: CLINIC | Age: 79
End: 2024-10-10
Payer: MEDICARE

## 2024-10-10 DIAGNOSIS — C61 PROSTATE CANCER (H): Primary | ICD-10-CM

## 2024-10-11 NOTE — NURSING NOTE
[id number]: Informed Consent Note      The consent form, including purpose, risks and benefits, was reviewed with Dennis P Hilligoss, and all questions were answered before he signed the consent form. The patient understands that the study involves an active treatment phase as well as a post-treatment follow up phase.      Present during the discussion were myself and Ced via phone. A copy of the signed form was provided to the patient.  No procedures specific to this study were performed prior to the patient signing the consent form.     Consent Version Date: 21 Feb 2024 (study version) [facility] version 4 March 2024   Consent obtained by: Rakel Hdez    Date: 10/10/2024.      Phone call when patient signed wet ink consent on paper: 10/8/2024.  Received via [employer] on 10/10 and uploaded to [employer] as certified copy.  Form signed electronically by consenter (myself) on 10/10/2024.  Signed copy of consent sent to patient on 10/21/2024.     HIPAA authorization signed?: yes  HIPAA authorization version date: approved at study level July 15 2024.   standard document version date 5/13/2021     Rakel Hdez RN     Form 503.03.01 (Version 2)     Effective date: 01AUG2018     Next Review Date: 01AUG2020        Race and ethnicity identification note      I discussed with Dennis P Hilligoss that the National [employer] [place] (NCI) has asked that information on race and ethnicity be obtained from NCI-sponsored studies' participants whenever possible and that the purpose for this request is to assist the NCI in evaluating whether persons of all races and ethnicity are given the opportunity to participate in new cancer treatment options. It was explained that the information will be kept in a confidential file in the [facility] Clinical Trials Office and will be sent to the NCI in a way in which HE cannot be identified. It was also explained that providing this information is voluntary.     Ced PEREZ  Hilligodmitriy provided the following responses:     Ethnicity: not  or    Race: white   Country of birth: [place]  Country of residence[place]        Rakel Hdez RN        Form 505.00.01 (Version 3)     Effective date: 19JUN2020     Next Review Date: 19JUN2022       Reproductive Evaluation      Subject name: Dennis P Hilligoss     I discussed with the patient that in order to participate in this study he must agree to use effective contraception during therapy and continuing 5 DAYS AFTER CU64 INJECTION. Examples of effective contraception were provided, including hormonal contraception, intrauterine devices, and double barrier contraception. He agreed to use effective contraception per the study's requirements.  Rakel Hdez RN     Form 503.03.03 (Version 1)     Effective date: 01JUL2018     Next Review Date: 01JUL2020

## 2024-10-15 ENCOUNTER — TELEPHONE (OUTPATIENT)
Dept: ONCOLOGY | Facility: CLINIC | Age: 79
End: 2024-10-15
Payer: MEDICARE

## 2024-10-15 NOTE — TELEPHONE ENCOUNTER
[facility]: Cancer Care                                                                                      PSA and Creatinine orders faxed to patient's PCP upon request of the research team. Confirmed fax was successful.    Delisa Baker, RN, BSN, OCN  Oncology RN Care Coordinator  [facility]

## 2024-10-15 NOTE — TELEPHONE ENCOUNTER
----- Message from Rakel RUTLEDGE sent at 10/15/2024 11:52 AM CDT -----  Regarding: lab orders  Hi delbert!     Hoping you can help us fax some lab orders to Ced' PCP.   PSA and creatinine are ordered and released.  Can you fax to PCP at [facility]?      Thx!     Roberth Rodrigues MD   [facility]-- [facility]   [phone] (Work)  [phone] (Fax)  [address] [place] [address]

## 2024-10-17 ENCOUNTER — MYC MEDICAL ADVICE (OUTPATIENT)
Dept: ONCOLOGY | Facility: CLINIC | Age: 79
End: 2024-10-17
Payer: MEDICARE

## 2024-10-18 ENCOUNTER — TRANSFERRED RECORDS (OUTPATIENT)
Dept: HEALTH INFORMATION MANAGEMENT | Facility: CLINIC | Age: 79
End: 2024-10-18
Payer: MEDICARE

## 2024-10-23 ENCOUNTER — ALLIED HEALTH/NURSE VISIT (OUTPATIENT)
Dept: ONCOLOGY | Facility: CLINIC | Age: 79
End: 2024-10-23
Payer: MEDICARE

## 2024-10-23 DIAGNOSIS — C61 PROSTATE CANCER (H): Primary | ICD-10-CM

## 2024-10-23 NOTE — NURSING NOTE
Creatinine and PSA drawn at outside clinic but not visible yet.  See media section for results that will arrive in 1-2 days.     Confirmed that creatinine is 1.12 with GFR 67

## 2024-10-24 ENCOUNTER — ANCILLARY PROCEDURE (OUTPATIENT)
Dept: RADIOLOGY | Facility: CLINIC | Age: 79
End: 2024-10-24
Attending: INTERNAL MEDICINE
Payer: MEDICARE

## 2024-10-24 ENCOUNTER — ANCILLARY PROCEDURE (OUTPATIENT)
Dept: PET IMAGING | Facility: CLINIC | Age: 79
End: 2024-10-24
Attending: INTERNAL MEDICINE

## 2024-10-24 ENCOUNTER — ALLIED HEALTH/NURSE VISIT (OUTPATIENT)
Dept: ONCOLOGY | Facility: CLINIC | Age: 79
End: 2024-10-24

## 2024-10-24 DIAGNOSIS — C61 PROSTATE CANCER (H): Primary | ICD-10-CM

## 2024-10-24 DIAGNOSIS — C61 PROSTATE CANCER (H): ICD-10-CM

## 2024-10-24 RX ORDER — IOPAMIDOL 755 MG/ML
50-125 INJECTION, SOLUTION INTRAVASCULAR ONCE
Status: COMPLETED | OUTPATIENT
Start: 2024-10-24 | End: 2024-10-24

## 2024-10-24 RX ADMIN — IOPAMIDOL 107 ML: 755 INJECTION, SOLUTION INTRAVASCULAR at 10:15

## 2024-10-24 NOTE — DISCHARGE INSTRUCTIONS

## 2024-10-24 NOTE — PROGRESS NOTES
[id number]: Day 1 Study Visit Note     Subject name: Dennis P Hilligoss   Visit: Day 1 (copper Cu 64 PSMA I&T dosing and PET PSMA scan)  Visit Date: October 24, 2024  Time of Call: 09:06  [facility]     Did the study visit occur within the appropriate window allowed by the protocol? yes     Specific Adverse Event Assessment  Vital Signs: Hypertension (grade 1) within the 0-60 minutes post dose window @ 9:55am. All other vitals WNL.  Injection Site Reaction (tenderness, pain, warmth, swelling, erythema, itching): patient denies  Respiratory Symptoms (cough, dyspnea, wheezing, rhinorrhea): patient denies     I have personally interviewed Ced P Hilligodmitriy and reviewed his medical record for adverse events and concomitant medications and these have been recorded on the corresponding logs in Ced PEREZ Navinchristopher's research file.      Dennis P Hilligoss was given the opportunity to ask any trial related questions.      Aakash Newell  Clinical Research Coordinator III  [phone]  [email or web address]

## 2024-10-26 DIAGNOSIS — C61 MALIGNANT NEOPLASM OF PROSTATE (H): Primary | ICD-10-CM

## 2024-10-29 ENCOUNTER — ALLIED HEALTH/NURSE VISIT (OUTPATIENT)
Dept: ONCOLOGY | Facility: CLINIC | Age: 79
End: 2024-10-29
Payer: MEDICARE

## 2024-10-29 ENCOUNTER — PATIENT OUTREACH (OUTPATIENT)
Dept: ONCOLOGY | Facility: CLINIC | Age: 79
End: 2024-10-29
Payer: MEDICARE

## 2024-10-29 DIAGNOSIS — C61 MALIGNANT NEOPLASM OF PROSTATE (H): Primary | ICD-10-CM

## 2024-10-29 NOTE — PROGRESS NOTES
[facility]: Cancer Care                                                                                      MRI order faxed to Mar[facility] De Santiago.  Received confirmation fax went through.  Informed patient who will let us know when he gets scheduled.    Delisa Baker, RN, BSN, OCN  Oncology RN Care Coordinator  [facility]

## 2024-10-29 NOTE — PROGRESS NOTES
[id number]: Follow-Up Phone Call Note     Subject name: Dennis P Hilligoss   Visit: Follow-Up Phone Call (48-72 hours after copper Cu 64 PSMA I&T dosing)  Visit Date: October 29, 2024  Time of Call: 1:00pm     Did the study visit occur within the appropriate window allowed by the protocol? No, since the patient was dosed & scanned last Thursday 10/24/24, the 48-72 hour follow-up window fell over the weekend. Today's follow-up was approximately 48-hours out-of-window. Study sponsor was previously notified about this possibility for scans scheduled on Thursdays and provided approval to conduct follow-up calls out-of-window and confirmed they will not be protocol deviations.      Specific Adverse Event Assessment  Injection Site Reaction (tenderness, pain, warmth, swelling, erythema, itching): patient denies  Respiratory Symptoms (cough, dyspnea, wheezing, rhinorrhea): patient denies     I have personally interviewed Dennis P Hilligoss and reviewed his medical record for adverse events and these have been recorded on the corresponding logs in Dennis P Hilligoss's research file.      Dennis P Hilligoss was given the opportunity to ask any trial related questions.      Aakash Medina

## 2024-11-11 ENCOUNTER — PATIENT OUTREACH (OUTPATIENT)
Dept: ONCOLOGY | Facility: CLINIC | Age: 79
End: 2024-11-11
Payer: MEDICARE

## 2024-11-11 ENCOUNTER — ALLIED HEALTH/NURSE VISIT (OUTPATIENT)
Dept: ONCOLOGY | Facility: CLINIC | Age: 79
End: 2024-11-11
Payer: MEDICARE

## 2024-11-11 DIAGNOSIS — C61 PROSTATE CANCER (H): Primary | ICD-10-CM

## 2024-11-11 NOTE — PROGRESS NOTES
[facility]: Cancer Care                                                                                      Patient called today with a few questions relating to the labs and MRI done as part of the research study.  Writer unable to answer these questions.  Advised patient his message will be forwarded on to the research team.    Delisa Baker RN, BSN, OCN  Oncology RN Care Coordinator  [facility]

## 2024-11-15 ENCOUNTER — PATIENT OUTREACH (OUTPATIENT)
Dept: ONCOLOGY | Facility: CLINIC | Age: 79
End: 2024-11-15
Payer: MEDICARE

## 2024-11-15 NOTE — PROGRESS NOTES
[facility]: Cancer Care                                                                                      Patient called today regarding billing for the upcoming MRI he is to have for the research study he is participating in.  He states he had to pay for another test which he thought would be covered.  He doesn't want to do the MRI without reassurance it'll be covered by the study.    Advised patient, this is outside of my realm of knowledge.  Writer will send a message to the research team to assist.    Delisa Baker, RN, BSN, OCN  Oncology RN Care Coordinator  [facility]

## 2024-11-18 ENCOUNTER — ALLIED HEALTH/NURSE VISIT (OUTPATIENT)
Dept: ONCOLOGY | Facility: CLINIC | Age: 79
End: 2024-11-18
Payer: MEDICARE

## 2024-11-18 DIAGNOSIS — C61 MALIGNANT NEOPLASM OF PROSTATE (H): Primary | ICD-10-CM

## 2024-11-19 ENCOUNTER — ALLIED HEALTH/NURSE VISIT (OUTPATIENT)
Dept: ONCOLOGY | Facility: CLINIC | Age: 79
End: 2024-11-19
Payer: MEDICARE

## 2024-11-19 DIAGNOSIS — C61 MALIGNANT NEOPLASM OF PROSTATE (H): Primary | ICD-10-CM

## 2024-11-19 NOTE — PROGRESS NOTES
[id number] Solar-Recur Study Note   Subject name: Dennis P Hilligoss     Date: 11/19/2024    I contacted Dennis P Hilligoss via phone in regards to follow-up imaging per the Solar-Recur clinical trial. I relayed to Ced that I have confirmed with the study monitor that per the study protocol and due to his 10/27/22 CT scan, no further follow-up imaging is required by the protocol. Additionally I confirmed with Dr. Baker that he does not wish to obtain further imaging from the patient for standard-of-care at this time. Ced was understanding and agreeable to this plan.    Dennis P Hilligoss was given the opportunity to ask any trial related questions.    Did Dennis P Hilligoss expressed any concerns: yes      Aakash Newell  Clinical Research Coordinator III  [phone]  [email or web address]

## 2024-11-19 NOTE — PROGRESS NOTES
[id number] Solar-Recur Study Note   Subject name: Dennis P Hilligoss     Date: 11/18/2024    I contacted Dennis P Hilligoss via phone in regards to questions he had about the billing of his MRI scan (was scheduled for 11/17/24 at [facility]). Ced informed me that he cancelled his planned prostate MRI because he had not heard back from our team on whether an MRI collected outside of the [facility] system would still be covered by the study. Ced informed me that he was en route to [place], [place] and will be living down there until May 2025. I told Ced that the research team is still trying to confirm whether an outside MRI can or will be covered by the study, and plan to follow-up with him later this week.     Dennis P Hilligoss was given the opportunity to ask any trial related questions.    Did Dennis P Hilligoss expressed any concerns: chester Newell  Clinical Research Coordinator III  [phone]  [email or web address]

## 2024-11-20 NOTE — PROGRESS NOTES
[id number] Solar-Recur Study Note   Subject name: Dennis P Hilligoss     Date: 11/11/2024    I contacted Dennis P Hilligoss via phone in regards to questions about billing of study-related procedures. Ced informed me that he was sent a bill for the PSA and creatinine tests performed as part of the study, prior to his PET PSMA dosing & scan. These labs were collected from an external medical facility and then sent by the facility for processing to [facility]. I informed Ced that is was unclear whether these costs would be covered by the study sponsor, as they were incurred at a facility outside of the [facility] system.     Additionally, Ced is concerned he may be billed for his upcoming confirmatory MRI prostate scan, required per study protocol. Due to scheduling limitations, Ced scheduled his MRI at [facility] for 11/17/24. Again I informed Ced that I was unsure whether this scan could be paid for by the research study if it were performed outside of the [facility] system. I let Ced know the study team would investigate these questions and get back to him.    Dennis P Hilligoss was given the opportunity to ask any trial related questions.    Did Dennis P Hilligoss expressed any concerns: chester Newell  Clinical Research Coordinator III  [phone]  [email or web address]

## 2024-12-23 ENCOUNTER — ALLIED HEALTH/NURSE VISIT (OUTPATIENT)
Dept: ONCOLOGY | Facility: CLINIC | Age: 79
End: 2024-12-23
Payer: MEDICARE

## 2024-12-23 DIAGNOSIS — C61 MALIGNANT NEOPLASM OF PROSTATE (H): Primary | ICD-10-CM

## 2025-01-13 NOTE — PROGRESS NOTES
Solar-Recur [id number]: End of Study Note     Patient name: Dennis P Hilligoss   Visit: End of Study (60 days after copper Cu 64 PSMA I&T dosing)  Visit Date: 12/23/24    Patient Status: Completed  If patient withdrew or study was terminated early, indicate primary reason: N/A     After this patient's Day 1 copper Cu 64 PSMA I&T injection and PET/CT scan, were any additional confirmatory scans, biopsies, or surgical procedures performed between Study Days 2-60? No  - If yes, list the procedure and date: N/A  - If yes, list the name and date of any secondary follow-up procedures performed between Study Days 60-90: N/A    List any Efficacy Follow-Up procedures that were not collected: Confirmatory imaging, Secondary confirmatory imaging, Biopsy of PET-positive lesions, and Surgical procedure of PC    Aakash Newell  Clinical Research Coordinator III  [phone]  [email or web address]

## 2025-03-20 DIAGNOSIS — C61 MALIGNANT NEOPLASM OF PROSTATE (H): Primary | ICD-10-CM

## 2025-04-28 ENCOUNTER — HOSPITAL ENCOUNTER (OUTPATIENT)
Dept: PET IMAGING | Facility: CLINIC | Age: 80
Setting detail: NUCLEAR MEDICINE
Discharge: HOME | End: 2025-04-28
Attending: INTERNAL MEDICINE | Admitting: INTERNAL MEDICINE
Payer: MEDICARE

## 2025-04-28 DIAGNOSIS — C61 MALIGNANT NEOPLASM OF PROSTATE (H): ICD-10-CM

## 2025-04-28 LAB
CREAT BLD-MCNC: 1 MG/DL (ref 0.7–1.2)
EGFRCR SERPLBLD CKD-EPI 2021: >60 ML/MIN/1.73M2

## 2025-04-28 PROCEDURE — A9596 HC RX 343 MED OP 636: HCPCS | Performed by: INTERNAL MEDICINE

## 2025-04-28 PROCEDURE — 82565 ASSAY OF CREATININE: CPT

## 2025-04-28 PROCEDURE — 78815 PET IMAGE W/CT SKULL-THIGH: CPT | Mod: PS

## 2025-04-28 PROCEDURE — 250N000011 HC RX IP 250 OP 636: Performed by: INTERNAL MEDICINE

## 2025-04-28 PROCEDURE — 74177 CT ABD & PELVIS W/CONTRAST: CPT

## 2025-04-28 PROCEDURE — 78812 PET IMAGE SKULL-THIGH: CPT | Mod: 26 | Performed by: RADIOLOGY

## 2025-04-28 PROCEDURE — 71260 CT THORAX DX C+: CPT | Mod: 26 | Performed by: RADIOLOGY

## 2025-04-28 PROCEDURE — 343N000001 HC RX 343 MED OP 636: Performed by: INTERNAL MEDICINE

## 2025-04-28 PROCEDURE — 74177 CT ABD & PELVIS W/CONTRAST: CPT | Mod: 26 | Performed by: RADIOLOGY

## 2025-04-28 RX ORDER — IOPAMIDOL 755 MG/ML
10-135 INJECTION, SOLUTION INTRAVASCULAR ONCE
Status: COMPLETED | OUTPATIENT
Start: 2025-04-28 | End: 2025-04-28

## 2025-04-28 RX ADMIN — KIT FOR THE PREPARATION OF GALLIUM GA 68 GOZETOTIDE INJECTION 5.45 MILLICURIE: KIT INTRAVENOUS at 13:26

## 2025-04-28 RX ADMIN — IOPAMIDOL 111 ML: 755 INJECTION, SOLUTION INTRAVENOUS at 14:18

## 2025-04-30 ENCOUNTER — PATIENT OUTREACH (OUTPATIENT)
Dept: ONCOLOGY | Facility: CLINIC | Age: 80
End: 2025-04-30
Payer: MEDICARE

## 2025-04-30 NOTE — PROGRESS NOTES
[facility]: Cancer Care                                                                                      Patient called today to find out if he can be seen virtually rather than in person next week.  Patient had his scan and labs done already.  Patient was advised we will change his appointment to a virtual.    Delisa Baker, RN, BSN, OCN  Oncology RN Care Coordinator  [facility]

## 2025-05-03 NOTE — PROGRESS NOTES
Virtual Visit Details    Type of service:  Video Visit   Originating Location (pt. Location):  Home    Distant Location (provider location):  [facility]  Platform used for Video Visit:  Keith    -------------------------------------------------------------------------------------------------------------    FOLLOW UP VISIT  -  TELEMEDICINE      Reason for Visit:  Biochemically-recurrent prostate cancer, on observation.    History of Present Illness:  Mr. Hilligoss is a 79-year-old man who was diagnosed with prostate cancer in 2000, at the age of 55.  His PSA level at that time was 5.3 ng/mL.  He underwent a radical prostatectomy on 9/2000, which revealed [place] 3+4=7 prostate adenocarcinoma, stage pT2 N0 R0.     Following surgery, he subsequently developed a biochemical recurrence in 2011.  He declined salvage radiotherapy.  In the past 4 years, his PSA level has been rising at a slightly faster pace.  On 5/28/2019 his PSA was 1.1, on 11/3/2021 his PSA was 2.24, on 5/2/2022 his PSA was 3.01, and on 10/13/2022 his PSA was 3.1 ng/mL.  The PSA on 6/25/2024 was 4.4 ng/mL.  The most recent PSA (3/18/2025) was 5.5 ng/mL.  His PSA doubling time is about 24 months.    He underwent a CT scan and Bone scan on 9/20/2021.  Both imaging modalities failed to show any local recurrence or metastatic disease.  He presents today to discuss optimal management of his biochemically-recurrent prostate cancer.  He had a PSMA-PET scan (10/27/2022) which showed a prostatectomy with focal PSMA uptake at the vicinity of the urethral anastomosis, suspicious for focal recurrence.  A more recent PSMA-PET scan (4/28/25) was also generally unremarkable, showing the same urethral anastomosis lesion.  His PSA remains relatively slow-growing.  He returns today for a virtual follow-up visit.    PSA trend:  11/03/21 - 2.2  05/02/22 - 3.0  10/13/22 - 3.1  08/28/23 - 3.6  04/14/24 - 4.4  06/25/24 - 4.4  09/25/24 -  4.1  03/18/25 - 5.5 ng/mL    Review of Systems:  The patient reports feeling generally well.  He does have bilateral hearing loss.  He does not relate any significant urological symptoms at this time.  He has not lost or gained any weight recently.  He does not have any cancer related pain.  A comprehensive 14-system review of symptoms is otherwise generally within normal limits.  His ECOG score is 0.  His pain score is 0/10.    Past Medical History:  Hypertension  Hyperlipidemia  Diabetes mellitus, type 2 (HbA1C 12%)  Coronary artery disease s/p stenting  Chronic kidney disease  DJD of the spine, causing back pain  Sciatica  Gout  Cholelithiasis    Medications:  Aspirin 81 mg  Clopidogrel 75 mg  Pravastatin 20 mg  Metformin 1500 mg BID    Allergies:  Atorvastatin  Niacin    Physical Examination:  Mr. Hilligoss is a 79-year-old man who appears comfortable at rest.  His vital signs are unremarkable.  His HEENT examination is within normal limits.  There is no palpable cervical, axillary, supraclavicular or inguinal lymphadenopathy.  The cardiac, pulmonary and gastrointestinal examinations are generally within normal limits.  His neuromuscular examination is intact.  The extremities do not demonstrate pitting edema.  The skin evaluation is unremarkable.    PSMA-PET scan (10/27/2022):  Prostatectomy with focal PSMA uptake at the vicinity of the urethral anastomosis, suspicious for focal recurrence.     PSMA-PET scan (04/28/2025):  Surgically absent prostate gland. Redemonstration of focal uptake in the vicinity of the urethral anastomosis inferior to the bladder correlating to an enhancing nodule on CT which is concerning for locally recurrent/residual tumor (miPSMA score 2). Not significantly changed dating back to 10/27/2022.  Differential remains metastasis versus urinary contamination.  MRI may be helpful in distinguishing.  Alternatively next PET-PSMA could be ordered with IV contrast and Lasix protocol at [facility].   This may be helpful as the urinary tract will be outlined with IV contrast on delayed imaging and the urine will be less PSMA-avid making potential cancer more visible.  Juan disease: None.  Bones: A questionable focus in T8 with very mild uptake, below the diagnostic threshold; may consider thoracic spine MRI to better delineate if there is a lesion there.    Laboratories:  PSA (10/13/22): 3.1  PSA (03/15/23): 3.5  PSA (08/28/23): 3.6  PSA (04/14/24): 4.4  PSA (06/25/24): 4.4  PSA (09/25/24): 4.1  PSA (03/18/25): 5.5 ng/mL      ASSESSMENT AND PLAN:  Mr. Hilligoss is a 79-year-old man with a prior diagnosis of intermediate-risk prostate cancer (Gabriel 3+4=7, pT2 N0, PSA 5.3 ng/mL) who underwent radical prostatectomy in 9/2000, but subsequently developed a biochemical recurrence.  His current PSA level is 5.5 ng/mL, with a PSADT of 24 months.  His ECOG score is 0.  His pain score is 0/10.    We spent the first part of our consultation discussing the natural history of biochemically-recurrent prostate cancer, and reviewing the prognostic implications of the PSADT calculation.  In patients with a PSADT of 24 months, they generally have a metastasis-free survival (MFS) of 5-8 years, and an overall survival (OS) of 15-20 years.  Thus, in this setting we do not generally recommend initiation of systemic therapy, and in fact I have advised him against the use of androgen deprivation or other hormonal therapies at this time.    We also discussed the potential role of metastasis-directed therapy if a radiographic metastasis was detected.  He previously underwent a CT scan and a Bone scan in 9/2021 which were unremarkable.  His PSMA-PET scan from 10/2022 showed only focal PSMA uptake at the vicinity of the urethral anastomosis suspicious for focal recurrence, but no evidence of distant juan or osseous spread.  His recent PSMA-PET scan (4/28/25) continues to be unchanged.  Thus, no further action is needed at this time.  If  he is found to have any metastatic lesions on future PSMA-PET scans, then he could certainly consider metastasis-directed radiotherapy at that time.  Moving forward, he should undergo PSA testing once every 6-12 months as well as a repeat PSMA-PET scan every 2 years or if his PSA level reaches 10.0 ng/mL.  Our next visit together will be in about six months.    A total of 40 minutes were spent on this patient on the date of the encounter conducting chart review, review of test results, interpretation of tests, patient visit, documentation and discussion with other provider(s).  The patient was given the opportunity to ask multiple questions today, all of which were answered to his satisfaction.    Goldy Baker M.D.

## 2025-05-05 ENCOUNTER — VIRTUAL VISIT (OUTPATIENT)
Dept: ONCOLOGY | Facility: CLINIC | Age: 80
End: 2025-05-05
Attending: INTERNAL MEDICINE
Payer: MEDICARE

## 2025-05-05 VITALS — HEIGHT: 71 IN | BODY MASS INDEX: 25.48 KG/M2 | WEIGHT: 182 LBS

## 2025-05-05 DIAGNOSIS — C61 MALIGNANT NEOPLASM OF PROSTATE (H): Primary | ICD-10-CM

## 2025-05-05 PROCEDURE — 98007 SYNCH AUDIO-VIDEO EST HI 40: CPT | Performed by: INTERNAL MEDICINE

## 2025-05-05 PROCEDURE — 1126F AMNT PAIN NOTED NONE PRSNT: CPT | Mod: 95 | Performed by: INTERNAL MEDICINE

## 2025-05-05 RX ORDER — PRAVASTATIN SODIUM 20 MG
1 TABLET ORAL EVERY EVENING
COMMUNITY
Start: 2023-07-13

## 2025-05-05 ASSESSMENT — PAIN SCALES - GENERAL: PAINLEVEL_OUTOF10: NO PAIN (0)

## 2025-05-05 NOTE — LETTER
5/5/2025      Dennis P Hilligoss  [address] [address]      Dear Colleague,    Thank you for referring your patient, Dennis P Hilligoss, to the [facility]. Please see a copy of my visit note below.      Virtual Visit Details    Type of service:  Video Visit   Originating Location (pt. Location):  Home    Distant Location (provider location):  [facility]  Platform used for Video Visit:  Keith    -------------------------------------------------------------------------------------------------------------    FOLLOW UP VISIT  -  TELEMEDICINE      Reason for Visit:  Biochemically-recurrent prostate cancer, on observation.    History of Present Illness:  Mr. Hilligoss is a 79-year-old man who was diagnosed with prostate cancer in 2000, at the age of 55.  His PSA level at that time was 5.3 ng/mL.  He underwent a radical prostatectomy on 9/2000, which revealed [place] 3+4=7 prostate adenocarcinoma, stage pT2 N0 R0.     Following surgery, he subsequently developed a biochemical recurrence in 2011.  He declined salvage radiotherapy.  In the past 4 years, his PSA level has been rising at a slightly faster pace.  On 5/28/2019 his PSA was 1.1, on 11/3/2021 his PSA was 2.24, on 5/2/2022 his PSA was 3.01, and on 10/13/2022 his PSA was 3.1 ng/mL.  The PSA on 6/25/2024 was 4.4 ng/mL.  The most recent PSA (3/18/2025) was 5.5 ng/mL.  His PSA doubling time is about 24 months.    He underwent a CT scan and Bone scan on 9/20/2021.  Both imaging modalities failed to show any local recurrence or metastatic disease.  He presents today to discuss optimal management of his biochemically-recurrent prostate cancer.  He had a PSMA-PET scan (10/27/2022) which showed a prostatectomy with focal PSMA uptake at the vicinity of the urethral anastomosis, suspicious for focal recurrence.  A more recent PSMA-PET scan (4/28/25) was also generally unremarkable, showing the same  urethral anastomosis lesion.  His PSA remains relatively slow-growing.  He returns today for a virtual follow-up visit.    PSA trend:  11/03/21 - 2.2  05/02/22 - 3.0  10/13/22 - 3.1  08/28/23 - 3.6  04/14/24 - 4.4  06/25/24 - 4.4  09/25/24 - 4.1  03/18/25 - 5.5 ng/mL    Review of Systems:  The patient reports feeling generally well.  He does have bilateral hearing loss.  He does not relate any significant urological symptoms at this time.  He has not lost or gained any weight recently.  He does not have any cancer related pain.  A comprehensive 14-system review of symptoms is otherwise generally within normal limits.  His ECOG score is 0.  His pain score is 0/10.    Past Medical History:  Hypertension  Hyperlipidemia  Diabetes mellitus, type 2 (HbA1C 12%)  Coronary artery disease s/p stenting  Chronic kidney disease  DJD of the spine, causing back pain  Sciatica  Gout  Cholelithiasis    Medications:  Aspirin 81 mg  Clopidogrel 75 mg  Pravastatin 20 mg  Metformin 1500 mg BID    Allergies:  Atorvastatin  Niacin    Physical Examination:  Mr. Hilligoss is a 79-year-old man who appears comfortable at rest.  His vital signs are unremarkable.  His HEENT examination is within normal limits.  There is no palpable cervical, axillary, supraclavicular or inguinal lymphadenopathy.  The cardiac, pulmonary and gastrointestinal examinations are generally within normal limits.  His neuromuscular examination is intact.  The extremities do not demonstrate pitting edema.  The skin evaluation is unremarkable.    PSMA-PET scan (10/27/2022):  Prostatectomy with focal PSMA uptake at the vicinity of the urethral anastomosis, suspicious for focal recurrence.     PSMA-PET scan (04/28/2025):  Surgically absent prostate gland. Redemonstration of focal uptake in the vicinity of the urethral anastomosis inferior to the bladder correlating to an enhancing nodule on CT which is concerning for locally recurrent/residual tumor (miPSMA score 2). Not  significantly changed dating back to 10/27/2022.  Differential remains metastasis versus urinary contamination.  MRI may be helpful in distinguishing.  Alternatively next PET-PSMA could be ordered with IV contrast and Lasix protocol at [facility].  This may be helpful as the urinary tract will be outlined with IV contrast on delayed imaging and the urine will be less PSMA-avid making potential cancer more visible.  Monica disease: None.  Bones: A questionable focus in T8 with very mild uptake, below the diagnostic threshold; may consider thoracic spine MRI to better delineate if there is a lesion there.    Laboratories:  PSA (10/13/22): 3.1  PSA (03/15/23): 3.5  PSA (08/28/23): 3.6  PSA (04/14/24): 4.4  PSA (06/25/24): 4.4  PSA (09/25/24): 4.1  PSA (03/18/25): 5.5 ng/mL      ASSESSMENT AND PLAN:  Mr. Hilligoss is a 79-year-old man with a prior diagnosis of intermediate-risk prostate cancer ([place] 3+4=7, pT2 N0, PSA 5.3 ng/mL) who underwent radical prostatectomy in 9/2000, but subsequently developed a biochemical recurrence.  His current PSA level is 5.5 ng/mL, with a PSADT of 24 months.  His ECOG score is 0.  His pain score is 0/10.    We spent the first part of our consultation discussing the natural history of biochemically-recurrent prostate cancer, and reviewing the prognostic implications of the PSADT calculation.  In patients with a PSADT of 24 months, they generally have a metastasis-free survival (MFS) of 5-8 years, and an overall survival (OS) of 15-20 years.  Thus, in this setting we do not generally recommend initiation of systemic therapy, and in fact I have advised him against the use of androgen deprivation or other hormonal therapies at this time.    We also discussed the potential role of metastasis-directed therapy if a radiographic metastasis was detected.  He previously underwent a CT scan and a Bone scan in 9/2021 which were unremarkable.  His PSMA-PET scan from 10/2022 showed only focal PSMA uptake  at the vicinity of the urethral anastomosis suspicious for focal recurrence, but no evidence of distant juan or osseous spread.  His recent PSMA-PET scan (4/28/25) continues to be unchanged.  Thus, no further action is needed at this time.  If he is found to have any metastatic lesions on future PSMA-PET scans, then he could certainly consider metastasis-directed radiotherapy at that time.  Moving forward, he should undergo PSA testing once every 6-12 months as well as a repeat PSMA-PET scan every 2 years or if his PSA level reaches 10.0 ng/mL.  Our next visit together will be in about six months.    A total of 40 minutes were spent on this patient on the date of the encounter conducting chart review, review of test results, interpretation of tests, patient visit, documentation and discussion with other provider(s).  The patient was given the opportunity to ask multiple questions today, all of which were answered to his satisfaction.    Goldy Baker M.D.      Again, thank you for allowing me to participate in the care of your patient.        Sincerely,        Goldy Baker MD    Electronically signed

## 2025-05-05 NOTE — NURSING NOTE
Current patient location: [address] [place] [id number]    Is the patient currently in the state of [place]? YES    Visit mode: VIDEO    If the visit is dropped, the patient can be reconnected by:VIDEO VISIT: Send to [email or web address] at: [email or web address]    Will anyone else be joining the visit? NO  (If patient encounters technical issues they should call [phone]    Are changes needed to the allergy or medication list? No    Are refills needed on medications prescribed by this physician? NO    Rooming Documentation:  Unable to complete questionnaire(s) due to time    Reason for visit: [employer]ORST[employer]DS